# Patient Record
Sex: MALE | Employment: OTHER | URBAN - METROPOLITAN AREA
[De-identification: names, ages, dates, MRNs, and addresses within clinical notes are randomized per-mention and may not be internally consistent; named-entity substitution may affect disease eponyms.]

---

## 2017-06-22 DIAGNOSIS — H91.90 HEARING LOSS: Primary | ICD-10-CM

## 2017-06-23 ENCOUNTER — PRE VISIT (OUTPATIENT)
Dept: OTOLARYNGOLOGY | Facility: CLINIC | Age: 72
End: 2017-06-23

## 2017-06-23 NOTE — TELEPHONE ENCOUNTER
1.  Date/reason for appt:  6/30/17   Hearing Loss/HA Clearance    2.  Referring provider:  Self    3.  Call to patient (Yes / No - short description):  No,  indicated hasn't been seen elsewhere    Records reviewed.  All records are in Epic

## 2017-06-30 ENCOUNTER — OFFICE VISIT (OUTPATIENT)
Dept: OTOLARYNGOLOGY | Facility: CLINIC | Age: 72
End: 2017-06-30

## 2017-06-30 ENCOUNTER — OFFICE VISIT (OUTPATIENT)
Dept: AUDIOLOGY | Facility: CLINIC | Age: 72
End: 2017-06-30
Attending: OTOLARYNGOLOGY

## 2017-06-30 VITALS — BODY MASS INDEX: 30.67 KG/M2 | HEIGHT: 74 IN | WEIGHT: 239 LBS

## 2017-06-30 DIAGNOSIS — H90.3 SENSORY HEARING LOSS, BILATERAL: Primary | ICD-10-CM

## 2017-06-30 DIAGNOSIS — H90.3 ASYMMETRICAL SENSORINEURAL HEARING LOSS: Primary | ICD-10-CM

## 2017-06-30 ASSESSMENT — PAIN SCALES - GENERAL: PAINLEVEL: NO PAIN (0)

## 2017-06-30 NOTE — LETTER
Hearing Aid Medical Clearance    José Miguel Lavon Mckeon  June 30, 2017        This patient has received a medical examination and may be considered a suitable candidate for a hearing aid.         Physician: Dr. Constantino Felipe

## 2017-06-30 NOTE — PROGRESS NOTES
AUDIOLOGY REPORT    SUMMARY: Audiology visit completed. See audiogram for results.      RECOMMENDATIONS: Follow-up with ENT.    Ann Marie Diaz.  Licensed Audiologist  [place] #[id number]

## 2017-06-30 NOTE — LETTER
6/30/2017       RE: José Miguel Mckeon  [address] [address]     Dear Colleague,    Thank you for referring your patient, José Miguel Mckeon, to the [facility] EAR NOSE AND THROAT at [facility]. Please see a copy of my visit note below.    Neurotology Clinic Consultation Note  June 30, 2017    Reason for consultation: asymmetric sensorineural hearing loss    HPI:  Mr. Mckeon is a 71 yr old   who was referred from audiology for hearing aid clearance in the setting of asymmetric sensorineural hearing loss. His hearing loss has been progressive over many decades. He recently decided to get hearing aids and was referred from audiology because there was a 10dB hearing loss at higher frequencies. 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 Occasional tinnitus, denies otorrhea, otalgia, dizziness, vertigo, or other symptoms.    ROS: negative except as note in HPI.   Complete review reviewed in epic    Social:  Lives in [place]. Father had hearing problems but was not early in life. No other family history of hearing problems.    PMHx: unremarkable    Exam:  General: male in no apparent distress, pleasantly conversant  Head: atraumatic  Right ear: auricles normal, normal external auditory canal with mild cerumen, TM with normal bony landmarks and no evidence of effusion  Left ear: auricles normal, normal external auditory canal with mild cerumen, TM with normal bony landmarks and no evidence of effusion    Audiogram: reviewed in clinic today, moderate downward sloping to severe sensorineural hearing loss with 10dB discrepancy left worse than right at 4 frequencies 2000, 3000, 4000, 6000 Hz. Word recognition scores 96% at 75dB bilaterally. Normal tymps.    Impression/Plan:  Mr. Mckeon is a 71 yr old   who was referred from audiology for hearing aid clearance in  the setting of asymmetric sensorineural hearing loss. He does have a 10 dB difference between the right and left ear. 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    The patient is cleared medically for hearing aids.    Return to clinic PRN.     I, Constantino Felipe MD, saw this patient with the resident and agree with the resident s findings and plan of care as documented in the resident s note. I personally reviewed the medications, labs and imaging.      Again, thank you for allowing me to participate in the care of your patient.      Sincerely,    Constantino Felipe MD

## 2017-06-30 NOTE — LETTER
Date:July 3, 2017      Patient was self referred, no letter generated. Do not send.        [facility] Health Information

## 2017-06-30 NOTE — PATIENT INSTRUCTIONS
1.  You were seen in the ENT Clinic today by Dr. Felipe.  If you have any questions or concerns after your appointment, please call [phone].  Press option #1 for scheduling related needs.  Press option #3 for Nurse advice.  2.  Plan is to return to clinic on an as needed basis.  3. Nurse Coordinator is Emelina at [phone]

## 2017-06-30 NOTE — MR AVS SNAPSHOT
After Visit Summary   6/30/2017    José Miguel Mckeon    MRN: [id number]           Patient Information     Date Of Birth          1945        Visit Information        Provider Department      6/30/2017 9:00 AM Yahaira Flores AuD [facility]        Today's Diagnoses     Sensory hearing loss, bilateral    -  1       Follow-ups after your visit        Your next 10 appointments already scheduled     Aug 23, 2017  1:00 PM CDT   Hearing Aid Consult with Melvin Kirby [facility] Audiology ([facility])    [address] [place] [id number] [phone]            Sep 13, 2017 11:00 AM CDT   Hearing Aid Fitting with Melvin Kirby [facility] Audiology ([facility])    [address] [place] [address] [id number] [phone]            Oct 04, 2017 11:00 AM CDT   (Arrive by 10:45 AM)   Initial Review Program with Melvin Kirby [facility] Audiology ([facility])    [address] [place] [address] [id number] [phone]              Who to contact     Please call your clinic at [phone] to:    Ask questions about your health    Make or cancel appointments    Discuss your medicines    Learn about your test results    Speak to your doctor   If you have compliments or concerns about an experience at your clinic, or if you wish to file a complaint, please contact [facility] Patient Relations at [phone] or email us at [email or web address]         Additional Information About Your Visit        MyChart Information     Sp[employer] Paymentshart gives you secure access to your electronic health record. If you see a primary care provider, you can also send messages to your care team and make appointments. If you have questions, please call your primary care clinic.  If you do not have a primary care provider, please call  [phone] and they will assist you.      Teamleader is an electronic gateway that provides easy, online access to your medical records. With Teamleader, you can request a clinic appointment, read your test results, renew a prescription or communicate with your care team.     To access your existing account, please contact your [facility] or call [phone] for assistance.        Care EveryWhere ID     This is your Care EveryWhere ID. This could be used by other organizations to access your [place] medical records  [id number]         Blood Pressure from Last 3 Encounters:   No data found for BP    Weight from Last 3 Encounters:   06/30/17 108.4 kg (239 lb)              We Performed the Following     AUDIOGRAM/TYMPANOGRAM - INTERFACE     AUDIOLOGY ADULT REFERRAL     [facility] Eval & Speech Recog (27901)     Dmitry   ([id number])     Tymps / Reflex   ([id number])        Primary Care Provider    None Specified       No primary provider on file.        Equal Access to Services     BREN KULKARNI : Hadii vanessa tracy hadasho Soomaali, waaxda luqadaha, qaybta kaalmada adeegyada, mika emerson . So [facility] [phone].    ATENCIÓN: Si habla español, tiene a aparicio disposición servicios gratuitos de asistencia lingüística. Llame al [phone].    We comply with applicable federal civil rights laws and [place] laws. We do not discriminate on the basis of race, color, national origin, age, disability sex, sexual orientation or gender identity.            Thank you!     Thank you for choosing [facility] AUDIOLOGY  for your care. Our goal is always to provide you with excellent care. Hearing back from our patients is one way we can continue to improve our services. Please take a few minutes to complete the written survey that you may receive in the mail after your visit with us. Thank you!             Your Updated Medication List - Protect others around you: Learn how to  safely use, store and throw away your medicines at www.disposemymeds.org.          This list is accurate as of: 6/30/17 11:10 AM.  Always use your most recent med list.                   Brand Name Dispense Instructions for use Diagnosis    EQL GLUCOSAMINE-CHONDROIT--400-166 MG Tabs   Generic drug:  Glucosamine-Chondroitin-MSM           MULTIVITAMIN ADULT PO

## 2017-06-30 NOTE — MR AVS SNAPSHOT
After Visit Summary   6/30/2017    José Miguel Mckeon    MRN: [id number]           Patient Information     Date Of Birth          1945        Visit Information        Provider Department      6/30/2017 10:00 AM Constantino Felipe MD [facility] Ear Nose and Throat        Today's Diagnoses     Asymmetrical sensorineural hearing loss    -  1      Care Instructions    1.  You were seen in the ENT Clinic today by Dr. Felipe.  If you have any questions or concerns after your appointment, please call [phone].  Press option #1 for scheduling related needs.  Press option #3 for Nurse advice.  2.  Plan is to return to clinic on an as needed basis.  3. Nurse Coordinator is Emelina at [phone]            Follow-ups after your visit        Your next 10 appointments already scheduled     Aug 23, 2017  1:00 PM CDT   Hearing Aid Consult with Melvin Kirby [facility] Audiology ([facility])    [address] [place] [id number] [phone]            Sep 13, 2017 11:00 AM CDT   Hearing Aid Fitting with Melvin Kirby [facility] Audiology ([facility])    [address] [place] [id number] [phone]            Oct 04, 2017 11:00 AM CDT   (Arrive by 10:45 AM)   Initial Review Program with Melvin Kirby [facility] Audiology ([facility])    [address] [place] [id number] [phone]              Who to contact     Please call your clinic at [phone] to:    Ask questions about your health    Make or cancel appointments    Discuss your medicines    Learn about your test results    Speak to your doctor   If you have compliments or concerns about an experience at your clinic, or if you wish to file a complaint, please contact [facility] Physicians Patient Relations at [phone] or email us at  [email or web address]         Additional Information About Your Visit        3VRharanat Information     3VRhart gives you secure access to your electronic health record. If you see a primary care provider, you can also send messages to your care team and make appointments. If you have questions, please call your primary care clinic.  If you do not have a primary care provider, please call [phone] and they will assist you.      Chance (app) is an electronic gateway that provides easy, online access to your medical records. With Chance (app), you can request a clinic appointment, read your test results, renew a prescription or communicate with your care team.     To access your existing account, please contact your [facility] Clinic or call [phone] for assistance.        Care EveryWhere ID     This is your Care EveryWhere ID. This could be used by other organizations to access your [place] medical records  [id number]        Your Vitals Were     Height BMI (Body Mass Index)                1.88 m (6' 2.02\") 30.67 kg/m2           Blood Pressure from Last 3 Encounters:   No data found for BP    Weight from Last 3 Encounters:   06/30/17 108.4 kg (239 lb)              Today, you had the following     No orders found for display       Primary Care Provider    None Specified       No primary provider on file.        Equal Access to Services     BREN KULKARNI : Hadii vanessa morano Soleanne, waaxda luqadaha, qaybta kaalmada adeegyada, mika dallas. So [facility] [phone].    ATENCIÓN: Si habla español, tiene a aparicio disposición servicios gratuitos de asistencia lingüística. Llame al [phone].    We comply with applicable federal civil rights laws and [place] laws. We do not discriminate on the basis of race, color, national origin, age, disability sex, sexual orientation or gender identity.            Thank you!     Thank you for choosing  HEALTH EAR NOSE AND " THROAT  for your care. Our goal is always to provide you with excellent care. Hearing back from our patients is one way we can continue to improve our services. Please take a few minutes to complete the written survey that you may receive in the mail after your visit with us. Thank you!             Your Updated Medication List - Protect others around you: Learn how to safely use, store and throw away your medicines at www.disposemymeds.org.          This list is accurate as of: 6/30/17  3:36 PM.  Always use your most recent med list.                   Brand Name Dispense Instructions for use Diagnosis    EQL GLUCOSAMINE-CHONDROIT--400-166 MG Tabs   Generic drug:  Glucosamine-Chondroitin-MSM           MULTIVITAMIN ADULT PO

## 2017-06-30 NOTE — PROGRESS NOTES
Neurotology Clinic Consultation Note  June 30, 2017    Reason for consultation: asymmetric sensorineural hearing loss    HPI:  Mr. Mckeon is a 71 yr old   who was referred from audiology for hearing aid clearance in the setting of asymmetric sensorineural hearing loss. His hearing loss has been progressive over many decades. He recently decided to get hearing aids and was referred from audiology because there was a 10dB hearing loss at higher frequencies. 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 Occasional tinnitus, denies otorrhea, otalgia, dizziness, vertigo, or other symptoms.    ROS: negative except as note in HPI.   Complete review reviewed in epic    Social:  Lives in [place]. Father had hearing problems but was not early in life. No other family history of hearing problems.    PMHx: unremarkable    Exam:  General: male in no apparent distress, pleasantly conversant  Head: atraumatic  Right ear: auricles normal, normal external auditory canal with mild cerumen, TM with normal bony landmarks and no evidence of effusion  Left ear: auricles normal, normal external auditory canal with mild cerumen, TM with normal bony landmarks and no evidence of effusion    Audiogram: reviewed in clinic today, moderate downward sloping to severe sensorineural hearing loss with 10dB discrepancy left worse than right at 4 frequencies 2000, 3000, 4000, 6000 Hz. Word recognition scores 96% at 75dB bilaterally. Normal tymps.    Impression/Plan:  Mr. Mckeon is a 71 yr old   who was referred from audiology for hearing aid clearance in the setting of asymmetric sensorineural hearing loss. He does have a 10 dB difference between the right and left ear. 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    The patient is cleared medically for hearing  aids.    Return to clinic PRN.     I, Constantino Felipe MD, saw this patient with the resident and agree with the resident s findings and plan of care as documented in the resident s note. I personally reviewed the medications, labs and imaging.

## 2017-06-30 NOTE — NURSING NOTE
Chief Complaint   Patient presents with     Consult     Hearing loss/HA clearance     Jamarcus Hamlin LPN

## 2017-08-23 ENCOUNTER — OFFICE VISIT (OUTPATIENT)
Dept: AUDIOLOGY | Facility: CLINIC | Age: 72
End: 2017-08-23

## 2017-08-23 DIAGNOSIS — H90.3 SENSORY HEARING LOSS, BILATERAL: Primary | ICD-10-CM

## 2017-08-23 NOTE — PROGRESS NOTES
AUDIOLOGY REPORT    SUBJECTIVE:  José Miguel Mckeon is a 72 year old male who was seen in the Audiology Clinic at the [facility] for hearing aid consultation. The patient has been seen previously in this clinic on 6/30/2017 for hearing assessment and results indicated borderline normal sloping to profound sensorineural hearing loss for the right ear and borderline normal sloping to unmeasurable sensorineural hearing loss for the left ear. The patient was medically cleared for use of hearing amplification by Dr Nabil Felipe, neuro Otologist.    OBJECTIVE:  The patient was presented with different options for amplification to help aid in communication. Discussed styles, levels of technology and monaural vs. binaural fitting.     The hearing aid(s) mutually chosen were:  Binaural GN Resound [id number]  in the canal hearing aids  COLOR: 11  BATTERY SIZE: 312  EARMOLD/TIPS: #2 MP receivers      ASSESSMENT:   Reviewed purchase information and warranty information with patient. The 45 day trial period was explained to patient. Hearing aid(s) ordered.     PLAN:  Patient is scheduled to return in 3 weeks for a hearing aid fitting and programming. Purchase agreement will be completed on that date. Please contact this clinic with any questions or concerns.        Ann Marie Diaz.  Licensed Audiologist  [place] #[id number]

## 2017-08-23 NOTE — MR AVS SNAPSHOT
After Visit Summary   8/23/2017    José Miguel Mckeon    MRN: [id number]           Patient Information     Date Of Birth          1945        Visit Information        Provider Department      8/23/2017 1:00 PM Yahaira Flores AuD [facility]        Today's Diagnoses     Sensory hearing loss, bilateral    -  1       Follow-ups after your visit        Your next 10 appointments already scheduled     Sep 13, 2017 11:00 AM CDT   Hearing Aid Fitting with Melvin Kirby [facility] Audiology ([facility])    [address] [place] [id number] [phone]            Oct 04, 2017 11:00 AM CDT   (Arrive by 10:45 AM)   Initial Review Program with Melvin Kirby [facility] Audiology ([facility])    [address] [place] [id number] [phone]              Who to contact     Please call your clinic at [phone] to:    Ask questions about your health    Make or cancel appointments    Discuss your medicines    Learn about your test results    Speak to your doctor   If you have compliments or concerns about an experience at your clinic, or if you wish to file a complaint, please contact [facility] Physicians Patient Relations at [phone] or email us at [email or web address]         Additional Information About Your Visit        MyChart Information     C[employer] Data Centerst gives you secure access to your electronic health record. If you see a primary care provider, you can also send messages to your care team and make appointments. If you have questions, please call your primary care clinic.  If you do not have a primary care provider, please call [phone] and they will assist you.      Naiku is an electronic gateway that provides easy, online access to your medical records. With Naiku, you can request a clinic appointment, read your test results, renew a  prescription or communicate with your care team.     To access your existing account, please contact your [facility] Physicians Clinic or call [phone] for assistance.        Care EveryWhere ID     This is your Care EveryWhere ID. This could be used by other organizations to access your [place] medical records  [id number]         Blood Pressure from Last 3 Encounters:   No data found for BP    Weight from Last 3 Encounters:   06/30/17 108.4 kg (239 lb)              We Performed the Following     Hearing Aid Exam, Binaural (03739)        Primary Care Provider    None Specified       No primary provider on file.        Equal Access to Services     [facility]: Hadii aad ku hadasho Soomaali, waaxda luqadaha, qaybta kaalmada adeegyada, mika emerson . So [facility] [phone].    ATENCIÓN: Si habla español, tiene a aparicio disposición servicios gratuitos de asistencia lingüística. Llame al [phone].    We comply with applicable federal civil rights laws and [place] laws. We do not discriminate on the basis of race, color, national origin, age, disability sex, sexual orientation or gender identity.            Thank you!     Thank you for choosing [facility] AUDIOLOGY  for your care. Our goal is always to provide you with excellent care. Hearing back from our patients is one way we can continue to improve our services. Please take a few minutes to complete the written survey that you may receive in the mail after your visit with us. Thank you!             Your Updated Medication List - Protect others around you: Learn how to safely use, store and throw away your medicines at www.disposemymeds.org.          This list is accurate as of: 8/23/17  2:51 PM.  Always use your most recent med list.                   Brand Name Dispense Instructions for use Diagnosis    EQL GLUCOSAMINE-CHONDROIT--400-166 MG Tabs   Generic drug:  Glucosamine-Chondroitin-MSM           MULTIVITAMIN  ADULT PO

## 2017-09-13 ENCOUNTER — OFFICE VISIT (OUTPATIENT)
Dept: AUDIOLOGY | Facility: CLINIC | Age: 72
End: 2017-09-13

## 2017-09-13 DIAGNOSIS — H90.3 SENSORY HEARING LOSS, BILATERAL: Primary | ICD-10-CM

## 2017-09-13 NOTE — PROGRESS NOTES
AUDIOLOGY REPORT    BACKGROUND INFORMATION: José Miguel Mckeon was seen in Audiology at the [facility] on 9/13/2017 for fitting of bilateral Resound Linx 3D-5  in the canal hearing aids. Previous results have revealed borderline normal sloping to profound sensorineural hearing loss for the right ear and borderline normal sloping to unmeasurable sensorineural hearing loss for the left ear.  The patient was given medical clearance to pursue amplification by Dr. Nabil Felipe.      PROCEDURES: Hearing aids were placed and they provided a good fit.  Real-ear measurements were performed to set hearing aids to  NAL-NL1 targets. After the fitting an orientation was completed giving him information on the use of the instruments. The patient reported good volume and sound quality.  Use and care of the instruments was counseled.  Insertion and removal of the hearing aids was instructed.    SUMMARY AND RECOMMENDATIONS: Binaural hearing amplification was fit today.  Verification measures were performed.  The patient will return for follow-up.  Please call this clinic with questions regarding today s appointment.    Ann Marie Diaz.  Licensed Audiologist  [place] #[id number]

## 2017-09-29 ENCOUNTER — TELEPHONE (OUTPATIENT)
Dept: AUDIOLOGY | Facility: CLINIC | Age: 72
End: 2017-09-29

## 2017-09-29 NOTE — TELEPHONE ENCOUNTER
Patient scheduled for follow-up with Audiology Monday 10/2 but equipment will not arrive in time. Called patient (LMOM) to let him know Monday appt cancelled, asked that patient come Friday 10/6 1:00 instead. Patient will call to confirm time. Can be scheduled for  AUDIOLOGY HFP or message can be sent to Yahaira Flores confirming time so that we can get scheduled.    Ann Marie Diaz.  Licensed Audiologist  [place] #[id number]

## 2017-10-06 ENCOUNTER — OFFICE VISIT (OUTPATIENT)
Dept: AUDIOLOGY | Facility: CLINIC | Age: 72
End: 2017-10-06

## 2017-10-06 DIAGNOSIS — H90.3 SENSORY HEARING LOSS, BILATERAL: Primary | ICD-10-CM

## 2017-10-06 NOTE — MR AVS SNAPSHOT
After Visit Summary   10/6/2017    José Miguel Mckeon    MRN: [id number]           Patient Information     Date Of Birth          1945        Visit Information        Provider Department      10/6/2017 1:00 PM Yahaira Flores AuD M [facility] Audiology        Today's Diagnoses     Sensory hearing loss, bilateral    -  1       Follow-ups after your visit        Who to contact     Please call your clinic at [phone] to:    Ask questions about your health    Make or cancel appointments    Discuss your medicines    Learn about your test results    Speak to your doctor   If you have compliments or concerns about an experience at your clinic, or if you wish to file a complaint, please contact [facility] Patient Relations at [phone] or email us at [email or web address]         Additional Information About Your Visit        MyChart Information     [employer] gives you secure access to your electronic health record. If you see a primary care provider, you can also send messages to your care team and make appointments. If you have questions, please call your primary care clinic.  If you do not have a primary care provider, please call [phone] and they will assist you.      Ventive is an electronic gateway that provides easy, online access to your medical records. With Ventive, you can request a clinic appointment, read your test results, renew a prescription or communicate with your care team.     To access your existing account, please contact your [facility] or call [phone] for assistance.        Care EveryWhere ID     This is your Care EveryWhere ID. This could be used by other organizations to access your [place] medical records  [id number]         Blood Pressure from Last 3 Encounters:   No data found for BP    Weight from Last 3 Encounters:   06/30/17 108.4 kg (239 lb)              We Performed the Following     No  Charge, Hearing Aid Clinic Visit        Primary Care Provider    None Specified       No primary provider on file.        Equal Access to Services     BREN KULKARNI : Hadisabel Olivas, elio kenney, rejimoisés benaviedznelsonjules medina, mika glassabigailmahad [place]. So [facility] [phone].    ATENCIÓN: Si habla español, tiene a aparicio disposición servicios gratuitos de asistencia lingüística. Llame al [phone].    We comply with applicable federal civil rights laws and [place] laws. We do not discriminate on the basis of race, color, national origin, age, disability, sex, sexual orientation, or gender identity.            Thank you!     Thank you for choosing [facility] AUDIOLOGY  for your care. Our goal is always to provide you with excellent care. Hearing back from our patients is one way we can continue to improve our services. Please take a few minutes to complete the written survey that you may receive in the mail after your visit with us. Thank you!             Your Updated Medication List - Protect others around you: Learn how to safely use, store and throw away your medicines at www.disposemymeds.org.          This list is accurate as of: 10/6/17  2:36 PM.  Always use your most recent med list.                   Brand Name Dispense Instructions for use Diagnosis    EQL GLUCOSAMINE-CHONDROIT--400-166 MG Tabs   Generic drug:  Glucosamine-Chondroitin-MSM           MULTIVITAMIN ADULT PO

## 2017-10-06 NOTE — PROGRESS NOTES
AUDIOLOGY REPORT    BACKGROUND INFORMATION: José Miguel Mckeon was scheduled was seen in Audiology at the [facility] [place] on 10/6/2017 for fitting of bilateral Resound Linx 3D-5-R  in the canal hearing aids. Previous results have revealed borderline normal sloping to profound sensorineural hearing loss bilaterally.  The patient was fit with binaural hearing amplification 9/13/2017 but a rechargeable hearing aid option was recently released and so the patient returns today to have his current Linx 3D-5 (non-rechargeable) hearing aids exchanged for the upgraded option.    PROCEDURES: The hearing aids were placed and programmed but it was discovered that the right aid is intermittent. The  was changed but intermittency continues and so the device will be exchanged for new.     The patient reports he is doing well with the current set of hearing aids but that some soft-spoken woman are still hard to hear. The gain was increased for soft-input speech today. The patient also reports some difficulty with hearing in the presence of background noise and so the noise reduction was made stronger for the automatic program.       SUMMARY AND RECOMMENDATIONS: A hearing aid fitting was attempted but could not be completed due to issues with the new equipment. Adjustments were made to the current devices as noted above and the patient will return next week for fitting of rechargeable devices as one of the new devices was intermittent today and needs to be exchanged.  Please call this clinic with questions regarding today s appointment.    Ann Marie Diaz.  Licensed Audiologist  [place] #[id number]

## 2018-05-25 ENCOUNTER — HOSPITAL ENCOUNTER (OUTPATIENT)
Facility: CLINIC | Age: 73
Discharge: HOME | End: 2018-05-25
Attending: COLON & RECTAL SURGERY | Admitting: COLON & RECTAL SURGERY
Payer: MEDICARE

## 2018-05-25 ENCOUNTER — SURGERY (OUTPATIENT)
Age: 73
End: 2018-05-25

## 2018-05-25 VITALS
SYSTOLIC BLOOD PRESSURE: 122 MMHG | BODY MASS INDEX: 28.62 KG/M2 | WEIGHT: 235 LBS | HEIGHT: 76 IN | DIASTOLIC BLOOD PRESSURE: 78 MMHG | OXYGEN SATURATION: 95 % | RESPIRATION RATE: 16 BRPM

## 2018-05-25 LAB — COLONOSCOPY: NORMAL

## 2018-05-25 PROCEDURE — 45380 COLONOSCOPY AND BIOPSY: CPT | Performed by: COLON & RECTAL SURGERY

## 2018-05-25 PROCEDURE — G0500 MOD SEDAT ENDO SERVICE >5YRS: HCPCS | Performed by: COLON & RECTAL SURGERY

## 2018-05-25 PROCEDURE — 27210582 ZZH DEVICE CLIP RESOLUTION, EACH: Performed by: COLON & RECTAL SURGERY

## 2018-05-25 PROCEDURE — 99153 MOD SED SAME PHYS/QHP EA: CPT | Performed by: COLON & RECTAL SURGERY

## 2018-05-25 PROCEDURE — 25000128 H RX IP 250 OP 636: Performed by: COLON & RECTAL SURGERY

## 2018-05-25 PROCEDURE — 88305 TISSUE EXAM BY PATHOLOGIST: CPT | Mod: 26 | Performed by: COLON & RECTAL SURGERY

## 2018-05-25 PROCEDURE — 88305 TISSUE EXAM BY PATHOLOGIST: CPT | Performed by: COLON & RECTAL SURGERY

## 2018-05-25 PROCEDURE — 45385 COLONOSCOPY W/LESION REMOVAL: CPT | Performed by: COLON & RECTAL SURGERY

## 2018-05-25 RX ORDER — LIDOCAINE 40 MG/G
CREAM TOPICAL
Status: DISCONTINUED | OUTPATIENT
Start: 2018-05-25 | End: 2018-05-25 | Stop reason: HOSPADM

## 2018-05-25 RX ORDER — ONDANSETRON 4 MG/1
4 TABLET, ORALLY DISINTEGRATING ORAL EVERY 6 HOURS PRN
Status: DISCONTINUED | OUTPATIENT
Start: 2018-05-25 | End: 2018-05-25 | Stop reason: HOSPADM

## 2018-05-25 RX ORDER — FLUMAZENIL 0.1 MG/ML
0.2 INJECTION, SOLUTION INTRAVENOUS
Status: DISCONTINUED | OUTPATIENT
Start: 2018-05-25 | End: 2018-05-25 | Stop reason: HOSPADM

## 2018-05-25 RX ORDER — NALOXONE HYDROCHLORIDE 0.4 MG/ML
.1-.4 INJECTION, SOLUTION INTRAMUSCULAR; INTRAVENOUS; SUBCUTANEOUS
Status: DISCONTINUED | OUTPATIENT
Start: 2018-05-25 | End: 2018-05-25 | Stop reason: HOSPADM

## 2018-05-25 RX ORDER — ONDANSETRON 2 MG/ML
4 INJECTION INTRAMUSCULAR; INTRAVENOUS
Status: DISCONTINUED | OUTPATIENT
Start: 2018-05-25 | End: 2018-05-25 | Stop reason: HOSPADM

## 2018-05-25 RX ORDER — ONDANSETRON 2 MG/ML
4 INJECTION INTRAMUSCULAR; INTRAVENOUS EVERY 6 HOURS PRN
Status: DISCONTINUED | OUTPATIENT
Start: 2018-05-25 | End: 2018-05-25 | Stop reason: HOSPADM

## 2018-05-25 RX ORDER — FENTANYL CITRATE 50 UG/ML
INJECTION, SOLUTION INTRAMUSCULAR; INTRAVENOUS PRN
Status: DISCONTINUED | OUTPATIENT
Start: 2018-05-25 | End: 2018-05-25 | Stop reason: HOSPADM

## 2018-05-25 RX ADMIN — MIDAZOLAM 2 MG: 1 INJECTION INTRAMUSCULAR; INTRAVENOUS at 10:44

## 2018-05-25 RX ADMIN — FENTANYL CITRATE 100 MCG: 50 INJECTION, SOLUTION INTRAMUSCULAR; INTRAVENOUS at 10:43

## 2018-05-25 NOTE — H&P
"Pre-Endoscopy History and Physical     José Miguel Mckeon MRN# [id number]   YOB: 1945 Age: 72 year old     Date of Procedure: 5/25/2018  Primary care provider: Luis Akbar  Type of Endoscopy: Colonoscopy  Reason for Procedure: History of polyps  Type of Anesthesia Anticipated: Moderate Sedation    HPI:    José Miguel is a 72 year old male who will be undergoing the above procedure.      A history and physical has been performed. The patient's medications and allergies have been reviewed. The risks and benefits of the procedure and the sedation options and risks were discussed with the patient.  All questions were answered and informed consent was obtained.      He denies a personal or family history of anesthesia complications or bleeding disorders.     No Known Allergies       No current facility-administered medications on file prior to encounter.   Current Outpatient Prescriptions on File Prior to Encounter:  Glucosamine-Chondroitin-MSM (EQL GLUCOSAMINE-CHONDROIT-MSM) 500-400-166 MG TABS    Multiple Vitamins-Minerals (MULTIVITAMIN ADULT PO)        There is no problem list on file for this patient.       Past Medical History:   Diagnosis Date     Hearing problem         Past Surgical History:   Procedure Laterality Date     NASAL/SINUS POLYPECTOMY  10 yrs     ORTHOPEDIC SURGERY  2011/2016    Lt Knee tendons, Rt Knee tendon and Roto cuff       Social History   Substance Use Topics     Smoking status: Light Tobacco Smoker     Types: Cigars     Smokeless tobacco: Never Used     Alcohol use No       History reviewed. No pertinent family history.    REVIEW OF SYSTEMS:     5 point ROS negative except as noted above in HPI, including Gen., Resp., CV, GI &  system review.      PHYSICAL EXAM:   /80  Ht 1.918 m (6' 3.5\")  Wt 106.6 kg (235 lb)  SpO2 97%  BMI 28.99 kg/m2 Estimated body mass index is 28.99 kg/(m^2) as calculated from the following:    Height as of this encounter: 1.918 m (6' " "3.5\").    Weight as of this encounter: 106.6 kg (235 lb).   GENERAL APPEARANCE: healthy and alert  MENTAL STATUS: alert  RESP: lungs clear to auscultation - no rales, rhonchi or wheezes  CV: regular rates and rhythm and normal S1 S2, no S3 or S4      IMPRESSION   ASA Class 1 - Healthy patient, no medical problems        PLAN:     Plan for colonoscopy. We discussed the risks, benefits and alternatives and the patient wished to proceed.    The above has been forwarded to the consulting provider.      Emmanuel Thompson MD  [facility]  Phone: [phone]  May 25, 2018    "

## 2018-05-29 LAB — COPATH REPORT: NORMAL

## 2018-10-16 ENCOUNTER — OFFICE VISIT (OUTPATIENT)
Dept: AUDIOLOGY | Facility: CLINIC | Age: 73
End: 2018-10-16
Payer: COMMERCIAL

## 2018-10-16 DIAGNOSIS — H90.3 SENSORY HEARING LOSS, BILATERAL: Primary | ICD-10-CM

## 2018-10-16 NOTE — MR AVS SNAPSHOT
After Visit Summary   10/16/2018    José Miguel Mckeon    MRN: [id number]           Patient Information     Date Of Birth          1945        Visit Information        Provider Department      10/16/2018 9:00 AM Yahaira Flores AuD M [facility] Audiology        Today's Diagnoses     Sensory hearing loss, bilateral    -  1       Follow-ups after your visit        Who to contact     Please call your clinic at [phone] to:    Ask questions about your health    Make or cancel appointments    Discuss your medicines    Learn about your test results    Speak to your doctor            Additional Information About Your Visit        MyChart Information     Tr[employer] gives you secure access to your electronic health record. If you see a primary care provider, you can also send messages to your care team and make appointments. If you have questions, please call your primary care clinic.  If you do not have a primary care provider, please call [phone] and they will assist you.      Tr[employer]ob is an electronic gateway that provides easy, online access to your medical records. With [employer], you can request a clinic appointment, read your test results, renew a prescription or communicate with your care team.     To access your existing account, please contact your [facility] or call [phone] for assistance.        Care EveryWhere ID     This is your Care EveryWhere ID. This could be used by other organizations to access your [place] medical records  [id number]         Blood Pressure from Last 3 Encounters:   05/25/18 122/78    Weight from Last 3 Encounters:   05/25/18 106.6 kg (235 lb)   06/30/17 108.4 kg (239 lb)              We Performed the Following     No Charge, Hearing Aid Clinic Visit        Primary Care Provider Office Phone # Fax #    Luis Akbar [phone]       [facility] [address]  [place] [address]        Equal  Access to Services     [facility]: Hadii aad ku hadteaganjimy Brendon, waloryda luqadaha, qamoisés kanelsonmika morocho. So [facility] [place] [phone].    ATENCIÓN: Si habla español, tiene a aparicio disposición servicios gratuitos de asistencia lingüística. Llame al [phone].    We comply with applicable federal civil rights laws and [place] laws. We do not discriminate on the basis of race, color, national origin, age, disability, sex, sexual orientation, or gender identity.            Thank you!     Thank you for choosing [facility] AUDIOLOGY  for your care. Our goal is always to provide you with excellent care. Hearing back from our patients is one way we can continue to improve our services. Please take a few minutes to complete the written survey that you may receive in the mail after your visit with us. Thank you!             Your Updated Medication List - Protect others around you: Learn how to safely use, store and throw away your medicines at www.disposemymeds.org.          This list is accurate as of 10/16/18  9:27 AM.  Always use your most recent med list.                   Brand Name Dispense Instructions for use Diagnosis    EQL GLUCOSAMINE-CHONDROIT--400-166 MG Tabs   Generic drug:  Glucosamine-Chondroitin-MSM           MULTIVITAMIN ADULT PO

## 2018-10-16 NOTE — PROGRESS NOTES
AUDIOLOGY REPORT    BACKGROUND INFORMATION: José Miguel Mckeon was seen in Audiology at the [facility] [place] on 10/16/2018 for a hearing aid check.  The patient has been seen previously in this clinic for a hearing evaluation on 6/30/2017 and results revealed borderline normal sloping to profound sensorineural hearing loss for the right ear and borderline normal sloping to unmeasurable sensorineural hearing loss for the left ear.  The patient was fit with binaural Spout Linx 3D-5  in the canal hearing aids 9/13/2017.      TEST RESULTS AND PROCEDURES: The hearing aids were deep-cleaned and assessed and are functioning well. The patient reports good volume and sound quality and does not prefer adjustments today.    SUMMARY AND RECOMMENDATIONS: A hearing aid check was performed today.  Adjustments were made as noted above and the patient will return as needed or at least every 4-6 months for cleaning and assessment of hearing aid.  Please call this clinic with questions regarding today s visit.      Ann Marie Diaz.  Licensed Audiologist  [place] #[id number]

## 2019-05-02 ENCOUNTER — TELEPHONE (OUTPATIENT)
Dept: AUDIOLOGY | Facility: CLINIC | Age: 74
End: 2019-05-02

## 2019-05-02 NOTE — TELEPHONE ENCOUNTER
José Miguel Mckeon was seen at the [facility] on 5/2/19 for hearing aid services.  He reported his right hearing aid stopped working.  Examination revealed a dead .  The  (#2-MP) was replaced under warranty and the hearing aid returned to normal function.  Mr. Mckeon reported good sound quality and will return to the clinic as needed.

## 2019-09-10 ENCOUNTER — OFFICE VISIT (OUTPATIENT)
Dept: AUDIOLOGY | Facility: CLINIC | Age: 74
End: 2019-09-10
Payer: MEDICARE

## 2019-09-10 DIAGNOSIS — H90.3 SENSORINEURAL HEARING LOSS, BILATERAL: Primary | ICD-10-CM

## 2019-09-10 NOTE — PROGRESS NOTES
AUDIOLOGY REPORT    BACKGROUND INFORMATION: José Miguel Mckeon was seen in Audiology at the [facility] [place] on 9/10/2019 for a hearing aid check.  The patient has been seen previously in this clinic for a hearing evaluation on 6/30/2017 and results revealed normal sloping to profound sensorineural hearing loss for the right ear and normal sloping to unmeasurable sensorineural hearing loss for the left ear.  The patient was fit with binaural Resound LiNX 5 (561)  in the canal hearing aids 8/23/2017 ad returns today to have his hearing aids assessed as his  warranty ends 9/30/3019.      TEST RESULTS AND PROCEDURES: The hearing aids were deep-cleaned and assessed and found to be functioning properly. The patient reports good volume and sound quality and does not prefer adjustments today. The patient was asked if he would like the devices sent to the  for final analysis in warranty period but the patient has declined this option today.      SUMMARY AND RECOMMENDATIONS: A hearing aid check was performed today.  Adjustments were made as noted above and the patient will return as needed or at least every 4-6 months for cleaning and assessment of hearing aid.  It is recommended to also undergo an updated hearing evaluation in the near future in order to determine if there have been changes in hearing that need to be applied to hearing aid prescription. Please call this clinic with questions regarding today s visit.      Ann Marie Diaz.  Licensed Audiologist  [place] #[id number]

## 2019-10-04 ENCOUNTER — HEALTH MAINTENANCE LETTER (OUTPATIENT)
Age: 74
End: 2019-10-04

## 2020-02-08 ENCOUNTER — HEALTH MAINTENANCE LETTER (OUTPATIENT)
Age: 75
End: 2020-02-08

## 2020-06-02 ENCOUNTER — OFFICE VISIT (OUTPATIENT)
Dept: AUDIOLOGY | Facility: CLINIC | Age: 75
End: 2020-06-02

## 2020-06-02 DIAGNOSIS — H90.3 SENSORY HEARING LOSS, BILATERAL: Primary | ICD-10-CM

## 2020-11-08 ENCOUNTER — HEALTH MAINTENANCE LETTER (OUTPATIENT)
Age: 75
End: 2020-11-08

## 2021-03-28 ENCOUNTER — HEALTH MAINTENANCE LETTER (OUTPATIENT)
Age: 76
End: 2021-03-28

## 2021-09-11 ENCOUNTER — HEALTH MAINTENANCE LETTER (OUTPATIENT)
Age: 76
End: 2021-09-11

## 2022-04-23 ENCOUNTER — HEALTH MAINTENANCE LETTER (OUTPATIENT)
Age: 77
End: 2022-04-23

## 2022-10-30 ENCOUNTER — HEALTH MAINTENANCE LETTER (OUTPATIENT)
Age: 77
End: 2022-10-30

## 2023-06-01 ENCOUNTER — HEALTH MAINTENANCE LETTER (OUTPATIENT)
Age: 78
End: 2023-06-01

## 2023-09-05 ENCOUNTER — APPOINTMENT (OUTPATIENT)
Dept: URBAN - METROPOLITAN AREA CLINIC 256 | Age: 78
Setting detail: DERMATOLOGY
End: 2023-09-05

## 2023-09-05 VITALS — WEIGHT: 232 LBS | HEIGHT: 75 IN

## 2023-09-05 DIAGNOSIS — L57.8 OTHER SKIN CHANGES DUE TO CHRONIC EXPOSURE TO NONIONIZING RADIATION: ICD-10-CM

## 2023-09-05 DIAGNOSIS — L82.1 OTHER SEBORRHEIC KERATOSIS: ICD-10-CM

## 2023-09-05 DIAGNOSIS — Z71.89 OTHER SPECIFIED COUNSELING: ICD-10-CM

## 2023-09-05 DIAGNOSIS — D18.0 HEMANGIOMA: ICD-10-CM

## 2023-09-05 DIAGNOSIS — L82.0 INFLAMED SEBORRHEIC KERATOSIS: ICD-10-CM

## 2023-09-05 DIAGNOSIS — D22 MELANOCYTIC NEVI: ICD-10-CM

## 2023-09-05 PROBLEM — D22.39 MELANOCYTIC NEVI OF OTHER PARTS OF FACE: Status: ACTIVE | Noted: 2023-09-05

## 2023-09-05 PROBLEM — D22.5 MELANOCYTIC NEVI OF TRUNK: Status: ACTIVE | Noted: 2023-09-05

## 2023-09-05 PROBLEM — D22.61 MELANOCYTIC NEVI OF RIGHT UPPER LIMB, INCLUDING SHOULDER: Status: ACTIVE | Noted: 2023-09-05

## 2023-09-05 PROBLEM — D22.62 MELANOCYTIC NEVI OF LEFT UPPER LIMB, INCLUDING SHOULDER: Status: ACTIVE | Noted: 2023-09-05

## 2023-09-05 PROBLEM — D22.72 MELANOCYTIC NEVI OF LEFT LOWER LIMB, INCLUDING HIP: Status: ACTIVE | Noted: 2023-09-05

## 2023-09-05 PROBLEM — D22.71 MELANOCYTIC NEVI OF RIGHT LOWER LIMB, INCLUDING HIP: Status: ACTIVE | Noted: 2023-09-05

## 2023-09-05 PROBLEM — D18.01 HEMANGIOMA OF SKIN AND SUBCUTANEOUS TISSUE: Status: ACTIVE | Noted: 2023-09-05

## 2023-09-05 PROCEDURE — OTHER MIPS QUALITY: OTHER

## 2023-09-05 PROCEDURE — 99203 OFFICE O/P NEW LOW 30 MIN: CPT | Mod: 25

## 2023-09-05 PROCEDURE — 17110 DESTRUCT B9 LESION 1-14: CPT

## 2023-09-05 PROCEDURE — OTHER COUNSELING: OTHER

## 2023-09-05 PROCEDURE — OTHER LIQUID NITROGEN: OTHER

## 2023-09-05 ASSESSMENT — LOCATION SIMPLE DESCRIPTION DERM
LOCATION SIMPLE: LEFT FOREARM
LOCATION SIMPLE: ABDOMEN
LOCATION SIMPLE: LEFT THIGH
LOCATION SIMPLE: LEFT POSTERIOR THIGH
LOCATION SIMPLE: RIGHT UPPER ARM
LOCATION SIMPLE: LEFT FOREHEAD
LOCATION SIMPLE: RIGHT FOREARM
LOCATION SIMPLE: LEFT UPPER ARM
LOCATION SIMPLE: RIGHT POSTERIOR THIGH
LOCATION SIMPLE: RIGHT THIGH
LOCATION SIMPLE: LEFT LOWER BACK
LOCATION SIMPLE: LEFT CHEEK

## 2023-09-05 ASSESSMENT — LOCATION DETAILED DESCRIPTION DERM
LOCATION DETAILED: LEFT DISTAL POSTERIOR UPPER ARM
LOCATION DETAILED: LEFT VENTRAL PROXIMAL FOREARM
LOCATION DETAILED: LEFT INFERIOR MEDIAL MIDBACK
LOCATION DETAILED: LEFT INFERIOR CENTRAL MALAR CHEEK
LOCATION DETAILED: LEFT LATERAL FOREHEAD
LOCATION DETAILED: LEFT LATERAL ABDOMEN
LOCATION DETAILED: RIGHT DISTAL POSTERIOR THIGH
LOCATION DETAILED: RIGHT DISTAL POSTERIOR UPPER ARM
LOCATION DETAILED: RIGHT VENTRAL DISTAL FOREARM
LOCATION DETAILED: LEFT DISTAL POSTERIOR THIGH
LOCATION DETAILED: RIGHT ANTERIOR DISTAL THIGH
LOCATION DETAILED: LEFT ANTERIOR PROXIMAL THIGH

## 2023-09-05 ASSESSMENT — LOCATION ZONE DERM
LOCATION ZONE: FACE
LOCATION ZONE: TRUNK
LOCATION ZONE: ARM
LOCATION ZONE: LEG

## 2023-09-05 NOTE — HPI: FULL BODY SKIN EXAMINATION
What Is The Reason For Today's Visit?: Full Body Skin Examination
What Is The Reason For Today's Visit? (Being Monitored For X): concerning skin lesions on an annual basis
Additional History: The patient stated that in the past he had LN treatment done on similar lesions compared to the one present today. The patient would like a overall skin evaluation and treatment.

## 2023-09-05 NOTE — PROCEDURE: LIQUID NITROGEN
Post-Care Instructions: I reviewed with the patient in detail post-care instructions. Patient is to wear sunprotection, and avoid picking at any of the treated lesions. Pt may apply Vaseline to crusted or scabbing areas.
Show Topical Anesthesia Variable?: Yes
Detail Level: Detailed
Number Of Freeze-Thaw Cycles: 2 freeze-thaw cycles
Render Post-Care Instructions In Note?: no
Duration Of Freeze Thaw-Cycle (Seconds): 5-10
Medical Necessity Clause: This procedure was medically necessary because the lesions that were treated were:
Medical Necessity Information: It is in your best interest to select a reason for this procedure from the list below. All of these items fulfill various CMS LCD requirements except the new and changing color options.
Spray Paint Text: The liquid nitrogen was applied to the skin utilizing a spray paint frosting technique.
Consent: The patient's consent was obtained including but not limited to risks of crusting, scabbing, blistering, scarring, darker or lighter pigmentary change, recurrence, incomplete removal and infection.

## 2024-09-25 NOTE — MR AVS SNAPSHOT
After Visit Summary   9/13/2017    José Miguel Mckeon    MRN: [id number]           Patient Information     Date Of Birth          1945        Visit Information        Provider Department      9/13/2017 11:05 AM Yahaira Flores AuD M [facility] Audiology        Today's Diagnoses     Sensory hearing loss, bilateral    -  1       Follow-ups after your visit        Your next 10 appointments already scheduled     Oct 02, 2017 10:00 AM CDT   (Arrive by 9:45 AM)   Initial Review Program with Melvin Kirby [facility] Audiology ([facility] [place])    [address] [place] [id number] [phone]              Who to contact     Please call your clinic at [phone] to:    Ask questions about your health    Make or cancel appointments    Discuss your medicines    Learn about your test results    Speak to your doctor   If you have compliments or concerns about an experience at your clinic, or if you wish to file a complaint, please contact [facility] Patient Relations at [phone] or email us at [email or web address]         Additional Information About Your Visit        MyChart Information     [employer] gives you secure access to your electronic health record. If you see a primary care provider, you can also send messages to your care team and make appointments. If you have questions, please call your primary care clinic.  If you do not have a primary care provider, please call [phone] and they will assist you.      [employer] is an electronic gateway that provides easy, online access to your medical records. With [employer], you can request a clinic appointment, read your test results, renew a prescription or communicate with your care team.     To access your existing account, please contact your [facility] or call [phone] for assistance.        Care EveryWhere ID     This is your    [facility] [address] [facility] [id number] [phone]    9/25/2024    Jay Shields  [address] [facility] [address]      Dear Jay,     I hope you are doing well.    It has been sometime since I have heard from you and I want to reach out to see if you need additional services. Please contact me at [phone] if you would like to schedule another appointment.    If I do not hear from you by 10/25/24 (30 days from the date of this letter), I will assume you no longer desire my services and this episode of care will be closed. However, you may call me at any time and we will be happy to see you again.    You may also choose another provider in the community. We have included a list of other possible resources.    [facility] [place] Office [phone], [facility] [phone], and [facility] [phone]    If you have questions or concerns with this letter, please contact me directly to discuss at [phone].    Again, we would welcome the chance to work with you again at any point in the future.      Take Care,        JUAN CARLOS Aragon       Care EveryWhere ID. This could be used by other organizations to access your [place] medical records  [id number]         Blood Pressure from Last 3 Encounters:   No data found for BP    Weight from Last 3 Encounters:   06/30/17 108.4 kg (239 lb)              We Performed the Following     Hearing Aid Fitting        Primary Care Provider    None Specified       No primary provider on file.        Equal Access to Services     [facility]: Hadii aad ku hadasho Soomaali, waaxda luqadaha, qaybta kaalmada adeegyada, mika glassabigailmahad emerson . So [facility] [phone].    ATENCIÓN: Si habla español, tiene a aparicio disposición servicios gratuitos de asistencia lingüística. Llame al [phone].    We comply with applicable federal civil rights laws and [place] laws. We do not discriminate on the basis of race, color, national origin, age, disability sex, sexual orientation or gender identity.            Thank you!     Thank you for choosing [facility] AUDIOLOGY  for your care. Our goal is always to provide you with excellent care. Hearing back from our patients is one way we can continue to improve our services. Please take a few minutes to complete the written survey that you may receive in the mail after your visit with us. Thank you!             Your Updated Medication List - Protect others around you: Learn how to safely use, store and throw away your medicines at www.disposemymeds.org.          This list is accurate as of: 9/13/17 11:59 PM.  Always use your most recent med list.                   Brand Name Dispense Instructions for use Diagnosis    EQL GLUCOSAMINE-CHONDROIT--400-166 MG Tabs   Generic drug:  Glucosamine-Chondroitin-MSM           MULTIVITAMIN ADULT PO

## (undated) RX ORDER — FENTANYL CITRATE 50 UG/ML
INJECTION, SOLUTION INTRAMUSCULAR; INTRAVENOUS
Status: DISPENSED
Start: 2018-05-25